# Patient Record
Sex: FEMALE | Race: WHITE | NOT HISPANIC OR LATINO | Employment: FULL TIME | RURAL
[De-identification: names, ages, dates, MRNs, and addresses within clinical notes are randomized per-mention and may not be internally consistent; named-entity substitution may affect disease eponyms.]

---

## 2020-10-16 ENCOUNTER — OFFICE VISIT (OUTPATIENT)
Dept: FAMILY MEDICINE CLINIC | Facility: CLINIC | Age: 20
End: 2020-10-16

## 2020-10-16 VITALS
OXYGEN SATURATION: 98 % | RESPIRATION RATE: 18 BRPM | BODY MASS INDEX: 37.31 KG/M2 | HEART RATE: 113 BPM | DIASTOLIC BLOOD PRESSURE: 72 MMHG | TEMPERATURE: 98 F | SYSTOLIC BLOOD PRESSURE: 112 MMHG | HEIGHT: 61 IN | WEIGHT: 197.6 LBS

## 2020-10-16 DIAGNOSIS — Z02.1 ENCOUNTER FOR PRE-EMPLOYMENT EXAMINATION: Primary | ICD-10-CM

## 2020-10-16 PROBLEM — F33.9 CHRONIC RECURRENT MAJOR DEPRESSIVE DISORDER (HCC): Status: ACTIVE | Noted: 2020-10-16

## 2020-10-16 PROBLEM — F43.10 POSTTRAUMATIC STRESS DISORDER: Status: ACTIVE | Noted: 2020-10-16

## 2020-10-16 PROCEDURE — PEPHY: Performed by: FAMILY MEDICINE

## 2020-10-16 NOTE — PROGRESS NOTES
Bethanie Tyler is a 20 y.o. female.     Pre-Employment Exam:  Overall has: moderate activity with work/home activities, good appetite, feels well with no complaints, good energy level and is sleeping poorly. Nutrition: appropriate balanced diet. The patient has no complaints at this time. Physical limitations: none.       The following portions of the patient's history were reviewed and updated as appropriate: allergies, current medications, past family history, past medical history, past social history, past surgical history and problem list.    Patient Active Problem List   Diagnosis   • Chronic recurrent major depressive disorder (CMS/HCC)   • Posttraumatic stress disorder       No current outpatient medications on file prior to visit.     No current facility-administered medications on file prior to visit.      Current outpatient and discharge medications have been reconciled for the patient.  Reviewed by: Nilton Black MD      No Known Allergies    Review of Systems   Constitutional: Negative for activity change, appetite change, fatigue and fever.   HENT: Negative for ear pain, swollen glands and voice change.    Eyes: Negative for visual disturbance.   Respiratory: Negative for shortness of breath and wheezing.    Cardiovascular: Negative for chest pain and leg swelling.   Gastrointestinal: Negative for abdominal pain, blood in stool, constipation, diarrhea, nausea and vomiting.   Endocrine: Negative for polydipsia and polyuria.   Genitourinary: Negative for dysuria, frequency and hematuria.   Musculoskeletal: Negative for joint swelling, neck pain and neck stiffness.   Skin: Negative for rash and bruise.   Neurological: Negative for weakness, numbness and headache.   Psychiatric/Behavioral: Negative for suicidal ideas and depressed mood.     I have reviewed and confirmed the accuracy of the ROS as documented by the MA/LPN/RN Nilton Black MD      Objective   Vitals:    10/16/20  1401   BP: 112/72   Pulse: 113   Resp: 18   Temp: 98 °F (36.7 °C)   SpO2: 98%     Physical Exam  Constitutional:       Appearance: She is well-developed.   HENT:      Head: Normocephalic and atraumatic.      Right Ear: External ear normal.      Left Ear: External ear normal.      Nose: Nose normal.   Eyes:      Pupils: Pupils are equal, round, and reactive to light.   Neck:      Musculoskeletal: Normal range of motion and neck supple.   Cardiovascular:      Rate and Rhythm: Normal rate and regular rhythm.      Heart sounds: Normal heart sounds.   Pulmonary:      Effort: Pulmonary effort is normal.      Breath sounds: Normal breath sounds.   Abdominal:      General: Bowel sounds are normal.      Palpations: Abdomen is soft.   Musculoskeletal: Normal range of motion.   Skin:     General: Skin is warm and dry.   Neurological:      Mental Status: She is alert and oriented to person, place, and time.   Psychiatric:         Behavior: Behavior normal.         Thought Content: Thought content normal.         Judgment: Judgment normal.     I wore protective equipment throughout this patient encounter to include mask and eye protection. Hand hygiene was performed before donning protective equipment and after removal when leaving the room.    Assessment/Plan .  Diagnoses and all orders for this visit:    1. Encounter for pre-employment examination (Primary)  Comments:  cleared    Findings discussed. All questions answered.  Follow-up for routine health maintenance as directed